# Patient Record
Sex: FEMALE
[De-identification: names, ages, dates, MRNs, and addresses within clinical notes are randomized per-mention and may not be internally consistent; named-entity substitution may affect disease eponyms.]

---

## 2018-09-18 ENCOUNTER — HOSPITAL ENCOUNTER (EMERGENCY)
Dept: HOSPITAL 42 - ED | Age: 48
Discharge: HOME | End: 2018-09-18
Payer: COMMERCIAL

## 2018-09-18 VITALS — OXYGEN SATURATION: 100 % | HEART RATE: 78 BPM | RESPIRATION RATE: 18 BRPM

## 2018-09-18 VITALS — SYSTOLIC BLOOD PRESSURE: 128 MMHG | DIASTOLIC BLOOD PRESSURE: 82 MMHG | TEMPERATURE: 98 F

## 2018-09-18 VITALS — BODY MASS INDEX: 34.8 KG/M2

## 2018-09-18 DIAGNOSIS — H10.12: Primary | ICD-10-CM

## 2018-09-18 DIAGNOSIS — H57.8: ICD-10-CM

## 2018-09-18 NOTE — ED PDOC
Arrival/HPI





- General


Chief Complaint: Eye Problem


Time Seen by Provider: 09/18/18 03:59


Historian: Patient





- History of Present Illness


Narrative History of Present Illness (Text): 





09/18/18 04:37


48 year old female, with a past medical history that includes chronic sinus 

issues, presents to the emergency department for evaluation status post left 

eye pressure.  Patient states she woke up 2 hours prior with a feeling of 

pressure in her left eye.  Patient states she also could not see well, and her 

vision in that eye was blurry.  Patient informs that she went to her daughter's 

room and showed her the eye, which she noticed was red and watery.  Patient 

states her eye feels normal now, and her vision is also normal.  Patient denies 

any fevers, chills, chest pain, shortness of breath, cough, abdominal pain, 

nausea, vomiting, diarrhea, back pain, neck pain, urinary/bowel changes, or any 

other complaint.


 


Time/Duration: Prior to Arrival


Symptom Onset: Gradual





Past Medical History





- Provider Review


Nursing Documentation Reviewed: Yes





- Cardiac


Hx Cardiac Disorders: No





- Endocrine/Metabolic


Hx Hyperthyroidism: Yes





- Psychiatric


Hx Substance Use: No





Family/Social History





- Physician Review


Nursing Documentation Reviewed: Yes


Family/Social History: No Known Family HX


Smoking Status: Never Smoked


Hx Alcohol Use: No


Hx Substance Use: No





Allergies/Home Meds


Allergies/Adverse Reactions: 


Allergies





No Known Allergies Allergy (Unverified 06/24/14 13:15)


 











Review of Systems





- Physician Review


All systems were reviewed & negative as marked: Yes





- Review of Systems


Constitutional: Normal.  absent: Fevers, Night Sweats


Eyes: Vision Changes, Eye Pain (feeling of pressure, left eye)


ENT: Normal


Respiratory: Normal.  absent: SOB, Cough


Cardiovascular: Normal.  absent: Chest Pain


Gastrointestinal: Normal.  absent: Abdominal Pain, Diarrhea, Nausea, Vomiting


Genitourinary Female: Normal.  absent: Urine Output Changes


Musculoskeletal: Normal.  absent: Back Pain, Neck Pain


Skin: Normal


Neurological: Normal


Endocrine: Normal


Hemo/Lymphatic: Normal


Psychiatric: Normal





Physical Exam


Vital Signs Reviewed: Yes


Vital Signs











  Temp Pulse Resp BP Pulse Ox


 


 09/18/18 03:40  98.3 F  78  18  147/80  100











Temperature: Afebrile


Blood Pressure: Normal


Pulse: Regular


Respiratory Rate: Normal


Appearance: Positive for: Well-Appearing, Non-Toxic, Comfortable


Pain Distress: None


Mental Status: Positive for: Alert and Oriented X 3





- Systems Exam


Head: Present: Atraumatic, Normocephalic


Pupils: Present: PERRL, Other (Visual accuity of both eyes 20/15; visual 

accuity of each eye 20/40.)


Extroacular Muscles: Present: EOMI


Conjunctiva: Present: Normal.  No: Other (no corneal abrasions, ulcers, or 

dendrites vizualized on Fluorescein exam)


Mouth: Present: Moist Mucous Membranes


Neck: Present: Normal Range of Motion


Respiratory/Chest: Present: Clear to Auscultation, Good Air Exchange.  No: 

Respiratory Distress, Accessory Muscle Use


Cardiovascular: Present: Regular Rate and Rhythm, Normal S1, S2.  No: Murmurs


Abdomen: No: Tenderness, Distention, Peritoneal Signs


Back: Present: Normal Inspection


Upper Extremity: Present: Normal Inspection.  No: Cyanosis, Edema


Lower Extremity: Present: Normal Inspection.  No: Edema


Neurological: Present: GCS=15, CN II-XII Intact, Speech Normal


Skin: Present: Warm, Dry, Normal Color.  No: Rashes


Psychiatric: Present: Alert, Oriented x 3, Normal Insight, Normal Concentration





Medical Decision Making


ED Course and Treatment: 





09/18/18 04:47


Impression: 48 year old female presents with pressure and pain in left eye.





Plan:


-- Reassess and disposition





Prior Visits:


Notes and results from previous visits were reviewed. 





Progress Notes:


 





- Scribe Statement


The provider has reviewed the documentation as recorded by the Scribe


Tomás Talley





Provider Scribe Attestation:


All medical record entries made by the Scribe were at my direction and 

personally dictated by me. I have reviewed the chart and agree that the record 

accurately reflects my personal performance of the history, physical exam, 

medical decision making, and the department course for this patient. I have 

also personally directed, reviewed, and agree with the discharge instructions 

and disposition. 








Disposition/Present on Arrival





- Present on Arrival


History of DVT/PE: No


History of Uncontrolled Diabetes: No


Urinary Catheter: No


History of Decub. Ulcer: No


History Surgical Site Infection Following: None





- Disposition


Diagnosis: 


 Eye irritation, Acute allergic conjunctivitis of left eye





Disposition: HOME/ ROUTINE


Patient Problems: 


 Current Active Problems











Problem Status Onset


 


Eye irritation Acute  


 


Acute allergic conjunctivitis of left eye Acute  











Condition: STABLE


Discharge Instructions (ExitCare):  Conjunctivitis (Noninfectious Pinkeye) (DC)


Print Language: Tajik


Additional Instructions: 


Please avoid rubbing eyes with hands. For eye itchiness, use Clear Eyes 

lubricating drops


Referrals: 


Richelle Campbell MD [Primary Care Provider] - Follow up with primary


Forms:  [employer] (Botswanan)

## 2018-09-18 NOTE — ED PDOC
Arrival/HPI





- General


Chief Complaint: Eye Problem


Time Seen by Provider: 09/18/18 03:59





Past Medical History





- Cardiac


Hx Cardiac Disorders: No





- Endocrine/Metabolic


Hx Hyperthyroidism: Yes





- Psychiatric


Hx Substance Use: No





Family/Social History


Smoking Status: Never Smoked


Hx Alcohol Use: No


Hx Substance Use: No





Allergies/Home Meds


Allergies/Adverse Reactions: 


Allergies





No Known Allergies Allergy (Unverified 06/24/14 13:15)


 











Physical Exam





Vital Signs











  Temp Pulse Resp BP Pulse Ox


 


 09/18/18 03:40  98.3 F  78  18  147/80  100














Disposition/Present on Arrival





- Present on Arrival


Any Indicators Present on Arrival: No


History of DVT/PE: No


History of Uncontrolled Diabetes: No


Urinary Catheter: No


History of Decub. Ulcer: No


History Surgical Site Infection Following: None





- Disposition


Have Diagnosis and Disposition been Completed?: Yes


Diagnosis: 


 Eye irritation, Acute allergic conjunctivitis of left eye





Disposition: HOME/ ROUTINE


Disposition Time: 04:38


Patient Plan: Discharge


Condition: STABLE


Discharge Instructions (ExitCare):  Conjunctivitis (Noninfectious Pinkeye) (DC)


Print Language: Macanese


Additional Instructions: 


Please avoid rubbing eyes with hands. For eye itchiness, use Clear Eyes 

lubricating drops


Referrals: 


Richelle Campbell MD [Primary Care Provider] - Follow up with primary


Forms:  [employer] (Indian)

## 2018-12-14 ENCOUNTER — HOSPITAL ENCOUNTER (OUTPATIENT)
Dept: HOSPITAL 42 - ED | Age: 48
Setting detail: OBSERVATION
LOS: 1 days | Discharge: HOME | End: 2018-12-15
Attending: INTERNAL MEDICINE | Admitting: INTERNAL MEDICINE
Payer: COMMERCIAL

## 2018-12-14 VITALS — BODY MASS INDEX: 36.7 KG/M2

## 2018-12-14 DIAGNOSIS — I10: ICD-10-CM

## 2018-12-14 DIAGNOSIS — E78.5: ICD-10-CM

## 2018-12-14 DIAGNOSIS — E03.9: ICD-10-CM

## 2018-12-14 DIAGNOSIS — R07.89: Primary | ICD-10-CM

## 2018-12-14 DIAGNOSIS — K22.4: ICD-10-CM

## 2018-12-14 DIAGNOSIS — E78.00: ICD-10-CM

## 2018-12-14 DIAGNOSIS — Z83.3: ICD-10-CM

## 2018-12-14 LAB
ALBUMIN SERPL-MCNC: 4.1 G/DL (ref 3–4.8)
ALBUMIN/GLOB SERPL: 1.1 {RATIO} (ref 1.1–1.8)
ALT SERPL-CCNC: 20 U/L (ref 7–56)
APPEARANCE UR: CLEAR
APTT BLD: 27.2 SECONDS (ref 25.1–36.5)
AST SERPL-CCNC: 24 U/L (ref 14–36)
BASOPHILS # BLD AUTO: 0.03 K/MM3 (ref 0–2)
BASOPHILS NFR BLD: 0.5 % (ref 0–3)
BILIRUB UR-MCNC: NEGATIVE MG/DL
BNP SERPL-MCNC: 75.9 PG/ML (ref 0–450)
BUN SERPL-MCNC: 11 MG/DL (ref 7–21)
CALCIUM SERPL-MCNC: 8.7 MG/DL (ref 8.4–10.5)
COLOR UR: YELLOW
EOSINOPHIL # BLD: 0.2 10*3/UL (ref 0–0.7)
EOSINOPHIL NFR BLD: 2.8 % (ref 1.5–5)
ERYTHROCYTE [DISTWIDTH] IN BLOOD BY AUTOMATED COUNT: 14.4 % (ref 11.5–14.5)
GFR NON-AFRICAN AMERICAN: > 60
GLUCOSE UR STRIP-MCNC: NEGATIVE MG/DL
GRANULOCYTES # BLD: 3.88 10*3/UL (ref 1.4–6.5)
GRANULOCYTES NFR BLD: 64 % (ref 50–68)
HGB BLD-MCNC: 13 G/DL (ref 12–16)
INR PPP: 1.03
LEUKOCYTE ESTERASE UR-ACNC: NEGATIVE LEU/UL
LYMPHOCYTES # BLD: 1.7 10*3/UL (ref 1.2–3.4)
LYMPHOCYTES NFR BLD AUTO: 27.3 % (ref 22–35)
MCH RBC QN AUTO: 28.2 PG (ref 25–35)
MCHC RBC AUTO-ENTMCNC: 33.8 G/DL (ref 31–37)
MCV RBC AUTO: 83.5 FL (ref 80–105)
MONOCYTES # BLD AUTO: 0.3 10*3/UL (ref 0.1–0.6)
MONOCYTES NFR BLD: 5.4 % (ref 1–6)
PH UR STRIP: 7 [PH] (ref 4.7–8)
PLATELET # BLD: 373 10^3/UL (ref 120–450)
PMV BLD AUTO: 10.2 FL (ref 7–11)
PROT UR STRIP-MCNC: NEGATIVE MG/DL
PROTHROMBIN TIME: 11.8 SECONDS (ref 9.4–12.5)
RBC # BLD AUTO: 4.61 10^6/UL (ref 3.5–6.1)
RBC # UR STRIP: NEGATIVE /UL
SP GR UR STRIP: 1.01 (ref 1–1.03)
TROPONIN I SERPL-MCNC: < 0.01 NG/ML
TROPONIN I SERPL-MCNC: < 0.01 NG/ML
UROBILINOGEN UR STRIP-ACNC: 0.2 E.U./DL
WBC # BLD AUTO: 6.1 10^3/UL (ref 4.5–11)

## 2018-12-14 PROCEDURE — 71045 X-RAY EXAM CHEST 1 VIEW: CPT

## 2018-12-14 PROCEDURE — 83880 ASSAY OF NATRIURETIC PEPTIDE: CPT

## 2018-12-14 PROCEDURE — 83615 LACTATE (LD) (LDH) ENZYME: CPT

## 2018-12-14 PROCEDURE — 84484 ASSAY OF TROPONIN QUANT: CPT

## 2018-12-14 PROCEDURE — 82550 ASSAY OF CK (CPK): CPT

## 2018-12-14 PROCEDURE — 80053 COMPREHEN METABOLIC PANEL: CPT

## 2018-12-14 PROCEDURE — 85610 PROTHROMBIN TIME: CPT

## 2018-12-14 PROCEDURE — 84443 ASSAY THYROID STIM HORMONE: CPT

## 2018-12-14 PROCEDURE — 84703 CHORIONIC GONADOTROPIN ASSAY: CPT

## 2018-12-14 PROCEDURE — 93005 ELECTROCARDIOGRAM TRACING: CPT

## 2018-12-14 PROCEDURE — 93306 TTE W/DOPPLER COMPLETE: CPT

## 2018-12-14 PROCEDURE — 99285 EMERGENCY DEPT VISIT HI MDM: CPT

## 2018-12-14 PROCEDURE — 36415 COLL VENOUS BLD VENIPUNCTURE: CPT

## 2018-12-14 PROCEDURE — 81003 URINALYSIS AUTO W/O SCOPE: CPT

## 2018-12-14 PROCEDURE — 80061 LIPID PANEL: CPT

## 2018-12-14 PROCEDURE — 85730 THROMBOPLASTIN TIME PARTIAL: CPT

## 2018-12-14 PROCEDURE — 83735 ASSAY OF MAGNESIUM: CPT

## 2018-12-14 PROCEDURE — 85025 COMPLETE CBC W/AUTO DIFF WBC: CPT

## 2018-12-14 NOTE — RAD
Date of service: 



12/14/2018



HISTORY:

Chest pain



COMPARISON:

No prior. 



FINDINGS:



LUNGS:

No active pulmonary disease.



PLEURA:

No significant pleural effusion identified, no pneumothorax apparent.



CARDIOVASCULAR:

No atherosclerotic calcification present



Normal.



OSSEOUS STRUCTURES:

No significant abnormalities.



VISUALIZED UPPER ABDOMEN:

Normal.



OTHER FINDINGS:

None.



IMPRESSION:

No active disease.

## 2018-12-14 NOTE — ED PDOC
Arrival/HPI





- General


Chief Complaint: Chest Pain


Time Seen by Provider: 12/14/18 12:27


Historian: Patient





- History of Present Illness


Narrative History of Present Illness (Text): 





12/14/18 12:37


A 48 year old female, whose past medical history includes hypertension and 

hyperlipidemia, sent to the emergency department by her primary care doctor for 

chest pain from earlier today. Patient reports having left sided chest tightness

for the past two weeks. sent to er for eval by pmd robert. Patient denies any 

fever, chills,  shortness of breath, diarrhea, nausea, vomiting,  urinary 

symptoms, back pain, neck pain, headache, dizziness, or any other complaints. 





PMD: Dr. Campbell





12/14/18 15:37





Time/Duration: 4-6 hours (earlier today)


Symptom Onset: Gradual


Activities at Onset: Light


Context: Other (doctors office)





Past Medical History





- Provider Review


Nursing Documentation Reviewed: Yes





- Infectious Disease


Hx of Infectious Diseases: None





- Cardiac


Hx Cardiac Disorders: No





- Endocrine/Metabolic


Hx Hyperthyroidism: Yes





- Psychiatric


Hx Substance Use: No





- Anesthesia


Hx Anesthesia: No


Hx Anesthesia Reactions: No


Hx Malignant Hyperthermia: No





Family/Social History





- Physician Review


Nursing Documentation Reviewed: Yes


Family/Social History: No Known Family HX


Smoking Status: Never Smoked


Hx Alcohol Use: No


Hx Substance Use: No





Allergies/Home Meds


Allergies/Adverse Reactions: 


Allergies





Penicillins Allergy (Verified 12/14/18 12:35)


   ITCHING








Home Medications: 


                                    Home Meds











 Medication  Instructions  Recorded  Confirmed


 


RX: No Known Home Med  09/18/18 12/14/18














Review of Systems





- Physician Review


All systems were reviewed & negative as marked: Yes





- Review of Systems


Constitutional: absent: Fevers, Night Sweats


Respiratory: absent: SOB


Cardiovascular: Chest Pain


Gastrointestinal: absent: Diarrhea, Nausea, Vomiting


Genitourinary Female: absent: Urine Output Changes


Musculoskeletal: absent: Back Pain, Neck Pain


Neurological: absent: Headache, Dizziness





Physical Exam


Vital Signs Reviewed: Yes





Vital Signs











  Temp Pulse Resp BP Pulse Ox


 


 12/14/18 12:31  98.3 F  78  19  140/90  99











Temperature: Afebrile


Blood Pressure: Normal


Pulse: Regular


Respiratory Rate: Normal


Appearance: Positive for: Well-Appearing, Non-Toxic


Mental Status: Positive for: Alert and Oriented X 3





- Systems Exam


Head: Present: Atraumatic, Normocephalic


Pupils: Present: PERRL


Extroacular Muscles: Present: EOMI


Conjunctiva: Present: Normal


Mouth: Present: Moist Mucous Membranes


Neck: Present: Normal Range of Motion


Respiratory/Chest: Present: Clear to Auscultation, Good Air Exchange.  No: 

Respiratory Distress, Accessory Muscle Use


Cardiovascular: Present: Regular Rate and Rhythm, Normal S1, S2.  No: Murmurs


Abdomen: No: Tenderness, Distention, Peritoneal Signs


Back: Present: Normal Inspection


Upper Extremity: Present: Normal Inspection.  No: Cyanosis, Edema


Lower Extremity: Present: Normal Inspection.  No: Edema


Neurological: Present: GCS=15, CN II-XII Intact, Speech Normal


Skin: Present: Warm, Dry, Normal Color.  No: Rashes


Psychiatric: Present: Alert, Oriented x 3, Normal Insight, Normal Concentration





Medical Decision Making


ED Course and Treatment: 





12/14/18 12:43


Impression: 48 year old female sent to the emergency department for chest pain. 

ro acs per  neg





Plan:


-- EKG


-- Labs


-- CBC


-- COAGs


-- Chest X-ray 


-- HCG, qualitative urine


-- Urinalysis


-- Reassess and disposition





Prior Visits:


Notes and results from previous visits were reviewed. 





Progress Notes:





12/14/18 12:47


EKG: Ordered, reviewed, and independently interpreted the EKG.


Rate : 70 BPM


Rhythm : NSR


Interpretation : No ST-wave changes, perc negative. 





12/14/18 15:36


case discussed with dr jones, requests obs. asas given. pain free. 





- RAD Interpretation


Radiology Orders: 











12/14/18 12:36


CHEST PORTABLE [RAD] Stat 














- Scribe Statement


The provider has reviewed the documentation as recorded by the Kailey Tong





All medical record entries made by the [employer] were at my direction and 

personally dictated by me. I have reviewed the chart and agree that the record 

accurately reflects my personal performance of the history, physical exam, 

medical decision making, and the department course for this patient. I have also

 personally directed, reviewed, and agree with the discharge instructions and 

disposition.








Disposition/Present on Arrival





- Present on Arrival


Any Indicators Present on Arrival: No


History of DVT/PE: No


History of Uncontrolled Diabetes: No


Urinary Catheter: No


History of Decub. Ulcer: No


History Surgical Site Infection Following: None





- Disposition


Have Diagnosis and Disposition been Completed?: Yes


Diagnosis: 


 Chest pain





Disposition: HOSPITALIZED


Disposition Time: 12:30


Condition: STABLE

## 2018-12-14 NOTE — CARD
--------------- APPROVED REPORT --------------





Date of service: 12/14/2018



EKG Measurement

Heart [id number]

IN [id number]

[id number]

[id number]

[id number]



<Conclusion>

Normal sinus rhythm

Normal ECG

## 2018-12-15 VITALS — SYSTOLIC BLOOD PRESSURE: 105 MMHG | TEMPERATURE: 98 F | DIASTOLIC BLOOD PRESSURE: 70 MMHG

## 2018-12-15 VITALS — OXYGEN SATURATION: 100 %

## 2018-12-15 VITALS — RESPIRATION RATE: 18 BRPM

## 2018-12-15 VITALS — HEART RATE: 65 BPM

## 2018-12-15 LAB
HDLC SERPL-MCNC: 41 MG/DL (ref 29–60)
LDLC SERPL-MCNC: 102 MG/DL (ref 0–129)
TROPONIN I SERPL-MCNC: < 0.01 NG/ML

## 2018-12-15 NOTE — CON
DATE OF CONSULTATION:  2018



LOCATION:  The patient is in Room 266, Bed 1.



REASON FOR CONSULTATION:  Chest pain, hypertension, hyperlipidemia,

hypothyroidism.



HISTORY OF PRESENT ILLNESS:  A 48-year-old female.  She states that she was

sleeping last night and she woke up to go to bathroom.  At that time, she

felt some pressure-like pain in the left border of the sternum in the mid

area, which then traveled down under the left breast, but there is no

radiation to the arms or neck or back.  The patient states that in the

morning when she woke up, she was burping a lot.  The patient denies any

exertional chest pain.  She said this episode happened second time in the

last 2 weeks and both times it happened at rest.  The patient is a known

case of hypertension, hypothyroidism, hyperlipidemia.



PAST MEDICAL HISTORY:  Positive for hypertension, hypothyroidism, high

cholesterol.



PAST SURGICAL HISTORY:  The patient has surgery for disk herniation of the

spine, bilateral carpal tunnel, and one .



FAMILY  HISTORY:  Mother is diabetic.



PERSONAL HISTORY:  Denies smoking.  Denies drinking.



HOME MEDICATIONS:  Lipitor, dose not known to the patient, one tablet

daily; chlorzoxazone 500 mg p.o. t.i.d.; enalapril/hydrochlorothiazide

10/25 mg one tablet daily; Singulair 10 mg h.s.; levothyroxine 100 mcg p.o.

daily;

Xyzal 5 mg p.o. daily; Neurontin 1 tablet p.o. h.s.



ALLERGIES:  THE PATIENT IS ALLERGIC TO PENICILLIN.



REVIEW OF SYSTEMS:  All the systems are reviewed and are positive as

mentioned in the history, otherwise negative.



PHYSICAL EXAMINATION:

VITAL SIGNS:  Blood pressure 119/82, respirations 21, pulse 64, temperature

98.6.

HEENT:  Head is normocephalic.  Eyes:  Pupils normal.  Conjunctivae normal.

Nose and throat normal.

NECK:  JVP low.  Carotids equal.

THORAX:  AP diameter normal.

LUNGS:  Clear.

CARDIOVASCULAR:  S1, S2.

CHEST:  The patient does not have any local tenderness on the chest wall.

ABDOMEN:  Soft, nontender.  No organomegaly.  Bowel sounds normal.

EXTREMITIES:  No clubbing.  No cyanosis.



LABORATORY DATA:  WBC 6.1, hemoglobin 13.0, hematocrit 38.5, platelets

373,000.  Sodium 141, potassium 4.3, BUN 11, creatinine 0.8.  Troponin x2

negative.  AST and ALT normal.  NT-proB natriuretic peptide, total protein,

albumin and globulin normal.  Chest x-ray clear.  EKG shows regular sinus

rhythm.



DIAGNOSES:  Atypical chest pain, can be esophageal spasm; hypertension;

high cholesterol; hypothyroidism.



PLAN:  We will start Protonix 40 p.o. daily.  We will order echocardiogram,

and if the patient stays here, we will do nuclear stress test on Monday. 

Otherwise, the patient will do nuclear stress test as an outpatient.  We

will follow with you.





__________________________________________

Jamaal Jarvis MD





DD:  2018 22:17:30

DT:  12/15/2018 2:06:29

Job # [id number]

## 2018-12-17 NOTE — CARD
--------------- APPROVED REPORT --------------





Date of service: 12/15/2018



EXAM: Two-dimensional and M-mode echocardiogram with Doppler and 

color Doppler.



INDICATION

CP, HTN



2D DIMENSIONS 

IVSd1.1   (0.7-1.1cm)LVDd4.6   (3.9-5.9cm)

PWd1.0   (0.7-1.1cm)LVDs3.2   (2.5-4.0cm)

FS (%) 31.2   %LVEF (%)59.1   (>50%)



M-Mode DIMENSIONS 

Left Atrium (MM)3.70   (2.5-4.0cm)Aortic Root2.70   (2.2-3.7cm)

Aortic Cusp Exc.2.10   (1.5-2.0cm)



Aortic Valve

AoV Peak Tstpdhzd649.0cm/Martha Peak GR.14mmHg



Mitral Valve

MV E Jzhfqtgw89.3cm/sMV A Gvfbxwum74.1cm/sE/A ratio1.3



TDI

Lateral E' Peak V11.40cm/sMedial E' Peak V7.80cm/sE/Lateral E'8.0

E/Medial E'11.7



Tricuspid Valve

TR Peak Vnzbysom309yk/sRAP IJPHFYOT32mrGnAS Peak Gr.25mmHg

[id number]



 LEFT VENTRICLE 

The left ventricle is normal size. There is normal left ventricular 

wall thickness. The left ventricular function is normal.EF-60% There 

is normal LV segmental wall motion. The left ventricular diastolic 

function is normal. No left ventricle thrombus noted on this study. 

There is no ventricular septal defect visualized. There is no left 

ventricular aneurysm. There is no mass noted in the left ventricle.



 RIGHT VENTRICLE 

The right ventricle is normal size. There is normal right ventricular 

wall thickness. The right ventricular systolic function is normal.



 ATRIA 

The left atrium size is normal. The right atrium size is normal. The 

interatrial septum is intact with no evidence for an atrial septal 

defect.



 AORTIC VALVE 

The aortic valve is normal in structure. No aortic regurgitation is 

present. There is no aortic valvular stenosis. There is no aortic 

valvular vegetation.



 MITRAL VALVE 

The mitral valve is thickened but opens well. Mitral regurgitation is 

trace. There is no mitral valve stenosis. There is no evidence of 

mitral valve prolapse.



 TRICUSPID VALVE 

The tricuspid valve leaflets are thickened , but open well. There is 

trace tricuspid regurgitation.RVSP-35 mmof hg. There is no tricuspid 

valve stenosis. There is no tricuspid valve prolapse or vegetation.



 PULMONIC VALVE 

The pulmonary valve is normal in structure. There is no pulmonic 

valvular regurgitation. There is no pulmonic valvular stenosis.



 GREAT VESSELS 

The aortic root is normal in size. The ascending aorta is normal in 

size. The pulmonary artery is normal. The IVC is normal in size and 

collapses >50% with inspiration.



 PERICARDIAL EFFUSION 

There is no pleural effusion. There is no pericardial effusion.



<Conclusion>

Normal Chamber Size. EF-60%

trace MR/TR

RVSP-35 mmof Hg.

## 2019-01-04 ENCOUNTER — HOSPITAL ENCOUNTER (OUTPATIENT)
Dept: HOSPITAL 42 - CARDIO | Age: 49
End: 2019-01-04
Payer: COMMERCIAL

## 2019-01-29 ENCOUNTER — HOSPITAL ENCOUNTER (OUTPATIENT)
Dept: HOSPITAL 31 - C.MAMMO | Age: 49
End: 2019-01-29
Payer: COMMERCIAL

## 2019-01-29 DIAGNOSIS — Z12.31: Primary | ICD-10-CM
